# Patient Record
Sex: MALE | Race: WHITE | NOT HISPANIC OR LATINO | URBAN - NONMETROPOLITAN AREA
[De-identification: names, ages, dates, MRNs, and addresses within clinical notes are randomized per-mention and may not be internally consistent; named-entity substitution may affect disease eponyms.]

---

## 2017-05-02 ENCOUNTER — TRANSCRIBE ORDERS (OUTPATIENT)
Dept: GENERAL RADIOLOGY | Facility: HOSPITAL | Age: 27
End: 2017-05-02

## 2017-05-02 ENCOUNTER — HOSPITAL ENCOUNTER (OUTPATIENT)
Dept: CT IMAGING | Facility: HOSPITAL | Age: 27
Discharge: HOME | End: 2017-05-02
Attending: INTERNAL MEDICINE | Admitting: INTERNAL MEDICINE

## 2017-05-02 DIAGNOSIS — M54.5 LOW BACK PAIN, UNSPECIFIED BACK PAIN LATERALITY, UNSPECIFIED CHRONICITY, WITH SCIATICA PRESENCE UNSPECIFIED: Primary | ICD-10-CM

## 2017-05-02 DIAGNOSIS — M54.5 LOW BACK PAIN, UNSPECIFIED BACK PAIN LATERALITY, UNSPECIFIED CHRONICITY, WITH SCIATICA PRESENCE UNSPECIFIED: ICD-10-CM

## 2017-05-02 PROCEDURE — 74176 CT ABD & PELVIS W/O CONTRAST: CPT

## 2024-02-14 ENCOUNTER — OFFICE VISIT (OUTPATIENT)
Dept: OTOLARYNGOLOGY | Facility: CLINIC | Age: 34
End: 2024-02-14
Payer: COMMERCIAL

## 2024-02-14 VITALS
BODY MASS INDEX: 30.59 KG/M2 | HEIGHT: 75 IN | HEART RATE: 82 BPM | RESPIRATION RATE: 16 BRPM | WEIGHT: 246 LBS | DIASTOLIC BLOOD PRESSURE: 102 MMHG | TEMPERATURE: 98.1 F | SYSTOLIC BLOOD PRESSURE: 163 MMHG

## 2024-02-14 DIAGNOSIS — J32.9 RECURRENT SINUSITIS: Primary | ICD-10-CM

## 2024-02-14 DIAGNOSIS — R09.81 NASAL CONGESTION: ICD-10-CM

## 2024-02-14 DIAGNOSIS — J34.89 SINUS PRESSURE: ICD-10-CM

## 2024-02-14 DIAGNOSIS — T78.40XA ALLERGY, INITIAL ENCOUNTER: ICD-10-CM

## 2024-02-14 DIAGNOSIS — J03.91 RECURRENT TONSILLITIS: ICD-10-CM

## 2024-02-14 RX ORDER — LEVOCETIRIZINE DIHYDROCHLORIDE 5 MG/1
TABLET, FILM COATED ORAL
COMMUNITY
Start: 2024-02-01 | End: 2024-02-14

## 2024-02-14 RX ORDER — PREDNISONE 10 MG/1
TABLET ORAL
COMMUNITY
Start: 2024-02-01

## 2024-02-14 RX ORDER — FLUTICASONE PROPIONATE 50 MCG
SPRAY, SUSPENSION (ML) NASAL
COMMUNITY
Start: 2024-02-01

## 2024-02-14 RX ORDER — AZELASTINE 1 MG/ML
2 SPRAY, METERED NASAL 2 TIMES DAILY
Qty: 30 ML | Refills: 11 | Status: SHIPPED | OUTPATIENT
Start: 2024-02-14

## 2024-02-14 RX ORDER — AMOXICILLIN 500 MG/1
CAPSULE ORAL
COMMUNITY
Start: 2024-02-01

## 2024-02-14 RX ORDER — MONTELUKAST SODIUM 10 MG/1
TABLET ORAL
COMMUNITY
Start: 2024-02-01

## 2024-02-21 ENCOUNTER — HOSPITAL ENCOUNTER (OUTPATIENT)
Dept: CT IMAGING | Facility: HOSPITAL | Age: 34
Discharge: HOME | End: 2024-02-21
Admitting: NURSE PRACTITIONER
Payer: COMMERCIAL

## 2024-02-21 DIAGNOSIS — J34.89 SINUS PRESSURE: ICD-10-CM

## 2024-02-21 DIAGNOSIS — T78.40XA ALLERGY, INITIAL ENCOUNTER: ICD-10-CM

## 2024-02-21 DIAGNOSIS — J32.9 RECURRENT SINUSITIS: ICD-10-CM

## 2024-02-21 DIAGNOSIS — R09.81 NASAL CONGESTION: ICD-10-CM

## 2024-02-21 PROCEDURE — 70486 CT MAXILLOFACIAL W/O DYE: CPT

## 2024-02-22 ENCOUNTER — TELEPHONE (OUTPATIENT)
Dept: OTOLARYNGOLOGY | Facility: CLINIC | Age: 34
End: 2024-02-22
Payer: COMMERCIAL

## 2024-02-22 NOTE — TELEPHONE ENCOUNTER
----- Message from MAGUE Farrell sent at 2/21/2024  3:36 PM CST -----  Please give patient CT results

## 2024-02-22 NOTE — TELEPHONE ENCOUNTER
I spoke with patient, gave results of CT sinuses,  No evidence of sinusitis. Ostiomeatal passages are open. The probable  uncomplicated lurdes bullosa of the right middle turbinate.   He has follow up on 3-19-24 with Dr Kelly at 9:00

## 2024-02-26 ENCOUNTER — PROCEDURE VISIT (OUTPATIENT)
Dept: OTOLARYNGOLOGY | Facility: CLINIC | Age: 34
End: 2024-02-26
Payer: COMMERCIAL

## 2024-02-26 DIAGNOSIS — T78.40XA ALLERGY, INITIAL ENCOUNTER: Primary | ICD-10-CM

## 2024-02-26 PROCEDURE — 95004 PERQ TESTS W/ALRGNC XTRCS: CPT | Performed by: OTOLARYNGOLOGY

## 2024-02-26 PROCEDURE — 95024 IQ TESTS W/ALLERGENIC XTRCS: CPT | Performed by: OTOLARYNGOLOGY

## 2024-02-26 NOTE — PROGRESS NOTES
Sherwin Blanchard   Allergy Testing Note:    Allergy Impact:  Allergy symptom severity: moderate  Allergy symptom frequency: erratic  Season allergy symptoms are worse: no seasonal pattern  Does allergy symptoms interfere with life?: sometimes  Does allergy symptoms interfere with sleep?: a little      Allergy Symptoms:  Nasal symptoms: congestion; drainage  Ocular symptoms: none  Ear symptoms: pain; itching  Throat symptoms: mucous; tickle in throat  Mouth symptoms: itching  Chest symptoms: none      Environmental History:  Occupation:   Home environment: carpeting; hardwood floor; fireplace; crawlspace ([employer])  Tobacco use: none  Tobacco use: none  Animal exposures: cats; dogs (Cat and Dog x1:Outdoors)  Home heating: natural gas  Home cooling: central air      Allergy History:  Previous allergy testing?: no  Previous immunotherapy?: no  Previous treatment in ER for allergic reaction?: no      Allergy Skin Testing:  Allergy testing was performed using the Modified Quantitative Technique. The procedure of allergy testing was explained to the patient including risks of itching burning and reactions both local and systemic. The patient understood and signed a consent. Alcohol prep was used to prepare the skin and a marking pen was used to label the Multi- Test and intradermal panels. Prick testing was performed on the forearms by using the Multitest applicator and applying gentle pressure. After 15 minutes the panels were read for reactions. Intradermal testing follow ups were then performed on the forearms. After 15 minutes, the panels were read for reactions. The results were explained to the patient and questions answered. No complications were noted.    Allergy Testing Results:  Consent: Y    Testing location: Arm    Allergen : Donovan    Testing Nurse/Tech: JERE    Reviewing Physician: RUPERT    Testing method: Skin Testing      Endpoints: 0=negative, higher number=higher reaction:  Vial: 3=  sublingual vial  Antigen Prick 5 2 EP VIAL    Histamine 7       normal controls     Glycerine Control 0          Eastern Tree Mix(T) 0      6   0        Black [place] (T) 0     6   0        Berm[place] Grass (G) 5   6     4        Tez Grass (G) 5   7     5        [place] Bluegrass (G) 5   7     5        Ragweed Mix (W) 5   6     4        Common Weed (W) 0     7   3        Mehran Weed(W) 0     7   3        Mugwort/ Jagdish (W) 0     6   0        Mold Mix (M) 5    7     5        Phycomycetes (M) 0     6   0        Fusarium (M) 0     6   0        Epicoccum (M) 0     6   0        Candida (M) 0     6   0        Trichophyton (M) 0     7   3        Phoma  (M) 0     6   0        Dust Mite Mix  5   7     5        Cockroach  0     6   0        Dog 5   6     4        Cat 5   7     5        Horse 0     6   0        Feather 0     6   0          Sherwin Blanchard  2/26/2024  16:13 CST

## 2024-02-27 NOTE — PROGRESS NOTES
I have reviewed the notes, assessments, allergy testing, mixing and/or procedures performed. I concur with her/his documentation of Judd Kelly MD, FACS  02/27/24  7:55 AM CST     Cleve Kelly MD

## 2024-03-18 NOTE — PROGRESS NOTES
YOB: 1990  Location: [facility]  Location Address: [address] [place] [address], [facility], [address] [place] [address]  Location Phone: [phone]    Chief Complaint   Patient presents with    Follow-up     Allergy testing and sinus CT       History of Present Illness  Judd Tabor is a 33 y.o. male.  Judd Tabor is here for follow up of ENT complaints. The patient has had problems with nasal congestion, ongoing allergy symptoms and frequent throat infections   Patient states symptoms have been present for the past 6 months   He is currently taking singulair as well as antihistamine   Patient is not currently using nasal sprays as he has difficulty with tolerance       Study Result    Narrative & Impression   EXAMINATION: CT SINUS WO CONTRAST-      2024 12:46 PM     HISTORY: recurrent sinusitis, nasal congestion, sinus pain, sinus  pressure; J32.9-Chronic sinusitis, unspecified; R09.81-Nasal congestion;  T78.40XA-Allergy, unspecified, initial encounter; J34.89-Other specified  disorders of nose and nasal sinuses     In order to have a CT radiation dose as low as reasonably achievable  Automated Exposure Control was utilized for adjustment of the mA and/or  KV according to patient size.     Total DLP = 706.06 mGy.cm     CT scan of the paranasal sinuses performed without intravenous  contrast...     Images are acquired in axial plane and subsequent reconstruction in  coronal and sagittal planes.     There is no previous similar study for comparison.     The maxillary sinuses bilaterally are normal and well aerated. No  mucosal thickening or fluid levels. The ostium and the infundibulum are  patent. Uncinate processes are intact.     The retromaxillary fat planes are intact.     Ethmoid air cells bilaterally are normal. No mucosal thickening. No  fluid levels.     Sphenoethmoidal recesses bilaterally are patent.     The sphenoid sinuses are normal. No mucosal thickening. No fluid levels.  No  nodules.     Frontal sinuses are moderately well developed. No mucosal thickening or  fluid levels. Frontonasal recesses are patent.     Normal fovea ethmoidalis is seen.     The type II olfactory fossa are noted. The cribriform plate are intact.     Olfactory sinuses are patent. There is a small lamellar lurdes bullosa  of the right middle turbinate.     No significant deviation of the nasal septum. No septal spur.     The orbits are normal. The lamina papyracea bilaterally are intact.     Nasolacrimal ducts are patent.     Mastoid air cells are clear.     The middle ear cavity is well aerated. Eustachian tubes are patent.     Nasopharyngeal soft tissues are normal and symmetrical. Parapharyngeal  spaces are normal and intact.     There is no significant cervical lymphadenopathy. Several nonenlarged  lymph nodes bilaterally, right more than the left, are noted.     IMPRESSION:  1. No evidence of sinusitis. Ostiomeatal passages are open. The probable  uncomplicated lurdes bullosa of the right middle turbinate.         This report was signed and finalized on 2/21/2024 2:14 PM by Dr. Davion Weber MD.        History reviewed. No pertinent past medical history.    Past Surgical History:   Procedure Laterality Date    FINGER SURGERY      left pinky       No outpatient medications have been marked as taking for the 3/19/24 encounter (Office Visit) with Cleve Kelly MD.       Patient has no known allergies.    Family History   Problem Relation Age of Onset    Hypertension Father     Hypertension Brother        Social History     Socioeconomic History    Marital status:    Tobacco Use    Smoking status: Never    Smokeless tobacco: Never   Vaping Use    Vaping status: Never Used   Substance and Sexual Activity    Alcohol use: Never    Drug use: Never       Review of Systems   Constitutional: Negative.    HENT:  Positive for congestion.    Allergic/Immunologic: Positive for environmental allergies.        Vitals:    03/19/24 0858   BP: 160/90   Pulse: 79   Temp: 98.2 °F (36.8 °C)       Body mass index is 31 kg/m².    Objective     Physical Exam  Vitals reviewed.   Constitutional:       Appearance: He is obese.   HENT:      Head: Normocephalic.      Right Ear: Tympanic membrane, ear canal and external ear normal.      Left Ear: Tympanic membrane, ear canal and external ear normal.      Nose:      Comments: Mild right to left septal deviation        Mouth/Throat:      Lips: Pink.      Mouth: Mucous membranes are moist.      Tonsils: 1+ on the right. 1+ on the left.   Neurological:      Mental Status: He is alert.         Assessment & Plan   Diagnoses and all orders for this visit:    1. Recurrent sinusitis (Primary)    2. Nasal congestion    3. Allergic rhinitis, unspecified seasonality, unspecified trigger      * Surgery not found *  No orders of the defined types were placed in this encounter.    Return in about 6 months (around 9/19/2024).     Continue singulair   Instructed on use of flonase     Patient Instructions   For the best response, use your nasal sprays every day without skipping doses. It may take several weeks before the full effect is acheived.  For the best response, use your nasal sprays every day without skipping doses. It may take several weeks before the full effect is acheived.

## 2024-03-19 ENCOUNTER — OFFICE VISIT (OUTPATIENT)
Dept: OTOLARYNGOLOGY | Facility: CLINIC | Age: 34
End: 2024-03-19
Payer: COMMERCIAL

## 2024-03-19 VITALS
HEART RATE: 79 BPM | HEIGHT: 75 IN | WEIGHT: 248 LBS | TEMPERATURE: 98.2 F | DIASTOLIC BLOOD PRESSURE: 90 MMHG | BODY MASS INDEX: 30.84 KG/M2 | SYSTOLIC BLOOD PRESSURE: 160 MMHG

## 2024-03-19 DIAGNOSIS — J30.9 ALLERGIC RHINITIS, UNSPECIFIED SEASONALITY, UNSPECIFIED TRIGGER: ICD-10-CM

## 2024-03-19 DIAGNOSIS — J32.9 RECURRENT SINUSITIS: Primary | ICD-10-CM

## 2024-03-19 DIAGNOSIS — R09.81 NASAL CONGESTION: ICD-10-CM

## 2024-03-19 NOTE — PATIENT INSTRUCTIONS
For the best response, use your nasal sprays every day without skipping doses. It may take several weeks before the full effect is acheived.  For the best response, use your nasal sprays every day without skipping doses. It may take several weeks before the full effect is acheived.

## 2024-11-10 ENCOUNTER — DOCUMENTATION (OUTPATIENT)
Dept: OTOLARYNGOLOGY | Facility: CLINIC | Age: 34
End: 2024-11-10
Payer: COMMERCIAL